# Patient Record
Sex: MALE | Race: WHITE | NOT HISPANIC OR LATINO | Employment: UNEMPLOYED | URBAN - NONMETROPOLITAN AREA
[De-identification: names, ages, dates, MRNs, and addresses within clinical notes are randomized per-mention and may not be internally consistent; named-entity substitution may affect disease eponyms.]

---

## 2022-08-02 ENCOUNTER — HOSPITAL ENCOUNTER (EMERGENCY)
Facility: HOSPITAL | Age: 42
Discharge: PSYCHIATRIC OR SUBSTANCE USE FACILITY | End: 2022-08-02
Attending: EMERGENCY MEDICINE | Admitting: EMERGENCY MEDICINE

## 2022-08-02 ENCOUNTER — HOSPITAL ENCOUNTER (INPATIENT)
Facility: HOSPITAL | Age: 42
LOS: 2 days | Discharge: HOME | End: 2022-08-04
Attending: PSYCHIATRY & NEUROLOGY | Admitting: PSYCHIATRY & NEUROLOGY

## 2022-08-02 VITALS
WEIGHT: 160 LBS | RESPIRATION RATE: 18 BRPM | SYSTOLIC BLOOD PRESSURE: 129 MMHG | HEIGHT: 74 IN | HEART RATE: 58 BPM | DIASTOLIC BLOOD PRESSURE: 85 MMHG | OXYGEN SATURATION: 100 % | BODY MASS INDEX: 20.53 KG/M2 | TEMPERATURE: 97.7 F

## 2022-08-02 DIAGNOSIS — F19.10 SUBSTANCE ABUSE: ICD-10-CM

## 2022-08-02 DIAGNOSIS — F10.10 ALCOHOL ABUSE: Primary | ICD-10-CM

## 2022-08-02 PROBLEM — F10.20 ALCOHOL DEPENDENCE: Status: ACTIVE | Noted: 2022-08-02

## 2022-08-02 LAB
ALBUMIN SERPL-MCNC: 4.14 G/DL (ref 3.5–5.2)
ALBUMIN/GLOB SERPL: 1.6 G/DL
ALP SERPL-CCNC: 62 U/L (ref 39–117)
ALT SERPL W P-5'-P-CCNC: 144 U/L (ref 1–41)
AMPHET+METHAMPHET UR QL: POSITIVE
AMPHETAMINES UR QL: POSITIVE
ANION GAP SERPL CALCULATED.3IONS-SCNC: 10.2 MMOL/L (ref 5–15)
AST SERPL-CCNC: 148 U/L (ref 1–40)
BACTERIA UR QL AUTO: NORMAL /HPF
BARBITURATES UR QL SCN: NEGATIVE
BASOPHILS # BLD AUTO: 0.06 10*3/MM3 (ref 0–0.2)
BASOPHILS NFR BLD AUTO: 0.8 % (ref 0–1.5)
BENZODIAZ UR QL SCN: NEGATIVE
BILIRUB SERPL-MCNC: 0.5 MG/DL (ref 0–1.2)
BILIRUB UR QL STRIP: NEGATIVE
BUN SERPL-MCNC: 10 MG/DL (ref 6–20)
BUN/CREAT SERPL: 15.2 (ref 7–25)
BUPRENORPHINE SERPL-MCNC: NEGATIVE NG/ML
CALCIUM SPEC-SCNC: 9.4 MG/DL (ref 8.6–10.5)
CANNABINOIDS SERPL QL: POSITIVE
CHLORIDE SERPL-SCNC: 102 MMOL/L (ref 98–107)
CLARITY UR: ABNORMAL
CO2 SERPL-SCNC: 25.8 MMOL/L (ref 22–29)
COCAINE UR QL: NEGATIVE
COLOR UR: ABNORMAL
CREAT SERPL-MCNC: 0.66 MG/DL (ref 0.76–1.27)
DEPRECATED RDW RBC AUTO: 44.9 FL (ref 37–54)
EGFRCR SERPLBLD CKD-EPI 2021: 118.6 ML/MIN/1.73
EOSINOPHIL # BLD AUTO: 0.07 10*3/MM3 (ref 0–0.4)
EOSINOPHIL NFR BLD AUTO: 1 % (ref 0.3–6.2)
ERYTHROCYTE [DISTWIDTH] IN BLOOD BY AUTOMATED COUNT: 12.5 % (ref 12.3–15.4)
ETHANOL BLD-MCNC: <10 MG/DL (ref 0–10)
ETHANOL UR QL: <0.01 %
FLUAV RNA RESP QL NAA+PROBE: NOT DETECTED
FLUBV RNA RESP QL NAA+PROBE: NOT DETECTED
GLOBULIN UR ELPH-MCNC: 2.7 GM/DL
GLUCOSE SERPL-MCNC: 106 MG/DL (ref 65–99)
GLUCOSE UR STRIP-MCNC: NEGATIVE MG/DL
HAV IGM SERPL QL IA: ABNORMAL
HBV CORE IGM SERPL QL IA: REACTIVE
HBV SURFACE AG SERPL QL IA: ABNORMAL
HCT VFR BLD AUTO: 39.1 % (ref 37.5–51)
HCV AB SER DONR QL: REACTIVE
HGB BLD-MCNC: 13.6 G/DL (ref 13–17.7)
HGB UR QL STRIP.AUTO: NEGATIVE
HIV1+2 AB SER QL: NORMAL
HYALINE CASTS UR QL AUTO: NORMAL /LPF
IMM GRANULOCYTES # BLD AUTO: 0.02 10*3/MM3 (ref 0–0.05)
IMM GRANULOCYTES NFR BLD AUTO: 0.3 % (ref 0–0.5)
KETONES UR QL STRIP: ABNORMAL
LEUKOCYTE ESTERASE UR QL STRIP.AUTO: ABNORMAL
LYMPHOCYTES # BLD AUTO: 2.6 10*3/MM3 (ref 0.7–3.1)
LYMPHOCYTES NFR BLD AUTO: 36.3 % (ref 19.6–45.3)
MAGNESIUM SERPL-MCNC: 1.7 MG/DL (ref 1.6–2.6)
MCH RBC QN AUTO: 33.7 PG (ref 26.6–33)
MCHC RBC AUTO-ENTMCNC: 34.8 G/DL (ref 31.5–35.7)
MCV RBC AUTO: 96.8 FL (ref 79–97)
METHADONE UR QL SCN: NEGATIVE
MONOCYTES # BLD AUTO: 0.68 10*3/MM3 (ref 0.1–0.9)
MONOCYTES NFR BLD AUTO: 9.5 % (ref 5–12)
NEUTROPHILS NFR BLD AUTO: 3.74 10*3/MM3 (ref 1.7–7)
NEUTROPHILS NFR BLD AUTO: 52.1 % (ref 42.7–76)
NITRITE UR QL STRIP: NEGATIVE
NRBC BLD AUTO-RTO: 0 /100 WBC (ref 0–0.2)
OPIATES UR QL: NEGATIVE
OXYCODONE UR QL SCN: NEGATIVE
PCP UR QL SCN: NEGATIVE
PH UR STRIP.AUTO: 7 [PH] (ref 5–8)
PLATELET # BLD AUTO: 200 10*3/MM3 (ref 140–450)
PMV BLD AUTO: 9.2 FL (ref 6–12)
POTASSIUM SERPL-SCNC: 4.1 MMOL/L (ref 3.5–5.2)
PROPOXYPH UR QL: NEGATIVE
PROT SERPL-MCNC: 6.8 G/DL (ref 6–8.5)
PROT UR QL STRIP: NEGATIVE
QT INTERVAL: 462 MS
QTC INTERVAL: 408 MS
RBC # BLD AUTO: 4.04 10*6/MM3 (ref 4.14–5.8)
RBC # UR STRIP: NORMAL /HPF
REF LAB TEST METHOD: NORMAL
SARS-COV-2 RNA RESP QL NAA+PROBE: NOT DETECTED
SODIUM SERPL-SCNC: 138 MMOL/L (ref 136–145)
SP GR UR STRIP: 1.02 (ref 1–1.03)
SQUAMOUS #/AREA URNS HPF: NORMAL /HPF
TRICYCLICS UR QL SCN: NEGATIVE
UROBILINOGEN UR QL STRIP: ABNORMAL
WBC # UR STRIP: NORMAL /HPF
WBC NRBC COR # BLD: 7.17 10*3/MM3 (ref 3.4–10.8)

## 2022-08-02 PROCEDURE — 82077 ASSAY SPEC XCP UR&BREATH IA: CPT | Performed by: PHYSICIAN ASSISTANT

## 2022-08-02 PROCEDURE — 93005 ELECTROCARDIOGRAM TRACING: CPT | Performed by: PSYCHIATRY & NEUROLOGY

## 2022-08-02 PROCEDURE — 85025 COMPLETE CBC W/AUTO DIFF WBC: CPT | Performed by: PHYSICIAN ASSISTANT

## 2022-08-02 PROCEDURE — 83735 ASSAY OF MAGNESIUM: CPT | Performed by: PHYSICIAN ASSISTANT

## 2022-08-02 PROCEDURE — 99284 EMERGENCY DEPT VISIT MOD MDM: CPT

## 2022-08-02 PROCEDURE — 87636 SARSCOV2 & INF A&B AMP PRB: CPT | Performed by: PHYSICIAN ASSISTANT

## 2022-08-02 PROCEDURE — 36415 COLL VENOUS BLD VENIPUNCTURE: CPT

## 2022-08-02 PROCEDURE — 80074 ACUTE HEPATITIS PANEL: CPT | Performed by: PSYCHIATRY & NEUROLOGY

## 2022-08-02 PROCEDURE — 80306 DRUG TEST PRSMV INSTRMNT: CPT | Performed by: PHYSICIAN ASSISTANT

## 2022-08-02 PROCEDURE — C9803 HOPD COVID-19 SPEC COLLECT: HCPCS

## 2022-08-02 PROCEDURE — 80053 COMPREHEN METABOLIC PANEL: CPT | Performed by: PHYSICIAN ASSISTANT

## 2022-08-02 PROCEDURE — G0432 EIA HIV-1/HIV-2 SCREEN: HCPCS | Performed by: PSYCHIATRY & NEUROLOGY

## 2022-08-02 PROCEDURE — 81001 URINALYSIS AUTO W/SCOPE: CPT | Performed by: PHYSICIAN ASSISTANT

## 2022-08-02 RX ORDER — HYDROXYZINE 50 MG/1
50 TABLET, FILM COATED ORAL EVERY 6 HOURS PRN
Status: DISCONTINUED | OUTPATIENT
Start: 2022-08-02 | End: 2022-08-04 | Stop reason: HOSPADM

## 2022-08-02 RX ORDER — TRAZODONE HYDROCHLORIDE 50 MG/1
50 TABLET ORAL NIGHTLY PRN
Status: DISCONTINUED | OUTPATIENT
Start: 2022-08-02 | End: 2022-08-04 | Stop reason: HOSPADM

## 2022-08-02 RX ORDER — ONDANSETRON 4 MG/1
4 TABLET, FILM COATED ORAL EVERY 6 HOURS PRN
Status: DISCONTINUED | OUTPATIENT
Start: 2022-08-02 | End: 2022-08-04 | Stop reason: HOSPADM

## 2022-08-02 RX ORDER — LORAZEPAM 0.5 MG/1
0.5 TABLET ORAL
Status: DISCONTINUED | OUTPATIENT
Start: 2022-08-06 | End: 2022-08-04

## 2022-08-02 RX ORDER — LORAZEPAM 2 MG/1
2 TABLET ORAL EVERY 4 HOURS PRN
Status: DISCONTINUED | OUTPATIENT
Start: 2022-08-03 | End: 2022-08-04

## 2022-08-02 RX ORDER — LORAZEPAM 1 MG/1
1 TABLET ORAL
Status: DISCONTINUED | OUTPATIENT
Start: 2022-08-05 | End: 2022-08-04

## 2022-08-02 RX ORDER — BENZTROPINE MESYLATE 1 MG/ML
1 INJECTION INTRAMUSCULAR; INTRAVENOUS ONCE AS NEEDED
Status: DISCONTINUED | OUTPATIENT
Start: 2022-08-02 | End: 2022-08-04 | Stop reason: HOSPADM

## 2022-08-02 RX ORDER — BENZONATATE 100 MG/1
100 CAPSULE ORAL 3 TIMES DAILY PRN
Status: DISCONTINUED | OUTPATIENT
Start: 2022-08-02 | End: 2022-08-04 | Stop reason: HOSPADM

## 2022-08-02 RX ORDER — LORAZEPAM 2 MG/1
2 TABLET ORAL
Status: DISCONTINUED | OUTPATIENT
Start: 2022-08-03 | End: 2022-08-04

## 2022-08-02 RX ORDER — LORAZEPAM 0.5 MG/1
0.5 TABLET ORAL EVERY 4 HOURS PRN
Status: DISCONTINUED | OUTPATIENT
Start: 2022-08-06 | End: 2022-08-04

## 2022-08-02 RX ORDER — IBUPROFEN 400 MG/1
400 TABLET ORAL EVERY 6 HOURS PRN
Status: DISCONTINUED | OUTPATIENT
Start: 2022-08-02 | End: 2022-08-04 | Stop reason: HOSPADM

## 2022-08-02 RX ORDER — MULTIPLE VITAMINS W/ MINERALS TAB 9MG-400MCG
1 TAB ORAL DAILY
Status: DISCONTINUED | OUTPATIENT
Start: 2022-08-02 | End: 2022-08-04 | Stop reason: HOSPADM

## 2022-08-02 RX ORDER — LORAZEPAM 2 MG/1
2 TABLET ORAL
Status: ACTIVE | OUTPATIENT
Start: 2022-08-02 | End: 2022-08-03

## 2022-08-02 RX ORDER — ECHINACEA PURPUREA EXTRACT 125 MG
2 TABLET ORAL AS NEEDED
Status: DISCONTINUED | OUTPATIENT
Start: 2022-08-02 | End: 2022-08-04 | Stop reason: HOSPADM

## 2022-08-02 RX ORDER — FAMOTIDINE 20 MG/1
20 TABLET, FILM COATED ORAL 2 TIMES DAILY PRN
Status: DISCONTINUED | OUTPATIENT
Start: 2022-08-02 | End: 2022-08-04 | Stop reason: HOSPADM

## 2022-08-02 RX ORDER — LORAZEPAM 1 MG/1
1 TABLET ORAL EVERY 4 HOURS PRN
Status: DISCONTINUED | OUTPATIENT
Start: 2022-08-05 | End: 2022-08-04

## 2022-08-02 RX ORDER — MULTIVITAMIN WITH IRON
2 TABLET ORAL DAILY
Status: DISCONTINUED | OUTPATIENT
Start: 2022-08-02 | End: 2022-08-04 | Stop reason: HOSPADM

## 2022-08-02 RX ORDER — LOPERAMIDE HYDROCHLORIDE 2 MG/1
2 CAPSULE ORAL
Status: DISCONTINUED | OUTPATIENT
Start: 2022-08-02 | End: 2022-08-04 | Stop reason: HOSPADM

## 2022-08-02 RX ORDER — ALUMINA, MAGNESIA, AND SIMETHICONE 2400; 2400; 240 MG/30ML; MG/30ML; MG/30ML
15 SUSPENSION ORAL EVERY 6 HOURS PRN
Status: DISCONTINUED | OUTPATIENT
Start: 2022-08-02 | End: 2022-08-04 | Stop reason: HOSPADM

## 2022-08-02 RX ORDER — BENZTROPINE MESYLATE 1 MG/1
2 TABLET ORAL ONCE AS NEEDED
Status: DISCONTINUED | OUTPATIENT
Start: 2022-08-02 | End: 2022-08-04 | Stop reason: HOSPADM

## 2022-08-02 NOTE — NURSING NOTE
Patient presents to intake and states that he is from [place]. He states that a oriana who helps out the homeless there told him about this place here that can help him and called them. They came and picked him up. He states that he has been living on the streets since getting out of [facility] last year. Hx of 12 felonies reported by patient with several [facility] term. He states that he has been at this new place since last night and he went to see the nurse this am and was telling her he was feeling bad so she said he needed to come here to detox. He reports that earlier he was shaking all over and hurting real bad. Hx of chronic pain. He states that he drinks and usually does so all thru ought the day. Some days up to 4 pints of liq if he can get it on the street. He also reports smoking pot once or twice a week and shooting iv  ice about a half a gm and sometimes more on average every few days when he can get it. Last use of any ice or pot was two days ago. Last use of etoh was yesterday around 2 pm. Pt denies any active SI HI or AVH. Appetite and sleep poor for months reports pt. CIWA 8 at this time. He states that he feels bad but earlier he was feeling rough. At this time he reports a headache and feeling some nausea and achy.

## 2022-08-02 NOTE — PLAN OF CARE
Problem: Adult Behavioral Health Plan of Care  Goal: Plan of Care Review  Recent Flowsheet Documentation  Taken 8/2/2022 1649 by Rosemary Powell, RN  Plan of Care Reviewed With: patient  Patient Agreement with Plan of Care: agrees   Goal Outcome Evaluation:  Plan of Care Reviewed With: patient  Patient Agreement with Plan of Care: agrees      New admission.  Care plan initiated.

## 2022-08-02 NOTE — NURSING NOTE
Spoke to  via phone. Intake information provided. Instructed to admit the patient to CD . Admit orders received. RBVOx2.. Patient and ed provider made aware of plan of care. Safety precautions maintained.

## 2022-08-02 NOTE — ED PROVIDER NOTES
Subjective   42-year-old white male presents secondary to need for alcohol detox and detox from methamphetamine.  He states his last alcohol use was 2 days ago and his last methamphetamine use was 3 days ago.  He states he uses that IV.  He denies any suicidal homicidal ideation.  Denies any exposure to COVID or COVID symptoms.  He voices no other complaints this time.  Symptoms are mild to moderate.          Review of Systems   Constitutional: Negative.  Negative for fever.   HENT: Negative.    Respiratory: Negative.    Cardiovascular: Negative.  Negative for chest pain.   Gastrointestinal: Negative.  Negative for abdominal pain.   Endocrine: Negative.    Genitourinary: Negative.  Negative for dysuria.   Skin: Negative.    Neurological: Negative.    Psychiatric/Behavioral: Negative.  Negative for suicidal ideas.   All other systems reviewed and are negative.      Past Medical History:   Diagnosis Date   • Alcohol abuse    • Anger reaction    • Anxiety    • Asthma    • Depression    • Liver disease    • PTSD (post-traumatic stress disorder)        No Known Allergies    No past surgical history on file.    No family history on file.    Social History     Socioeconomic History   • Marital status: Single   Tobacco Use   • Smoking status: Current Every Day Smoker     Packs/day: 1.00     Years: 0.50     Pack years: 0.50     Types: Cigarettes   • Smokeless tobacco: Never Used   Vaping Use   • Vaping Use: Never used   Substance and Sexual Activity   • Alcohol use: Yes     Comment: 3 to 4 pints thru out the day. I drink all day.   • Drug use: Yes     Types: Amphetamines   • Sexual activity: Yes     Partners: Female     Birth control/protection: None           Objective   Physical Exam  Vitals and nursing note reviewed.   Constitutional:       General: He is not in acute distress.     Appearance: He is well-developed. He is not diaphoretic.   HENT:      Head: Normocephalic and atraumatic.      Right Ear: External ear normal.       Left Ear: External ear normal.      Nose: Nose normal.   Eyes:      Conjunctiva/sclera: Conjunctivae normal.      Pupils: Pupils are equal, round, and reactive to light.   Neck:      Vascular: No JVD.      Trachea: No tracheal deviation.   Cardiovascular:      Rate and Rhythm: Normal rate and regular rhythm.      Heart sounds: Normal heart sounds. No murmur heard.  Pulmonary:      Effort: Pulmonary effort is normal. No respiratory distress.      Breath sounds: Normal breath sounds. No wheezing.   Abdominal:      General: Bowel sounds are normal.      Palpations: Abdomen is soft.      Tenderness: There is no abdominal tenderness.   Musculoskeletal:         General: No deformity. Normal range of motion.      Cervical back: Normal range of motion and neck supple.   Skin:     General: Skin is warm and dry.      Coloration: Skin is not pale.      Findings: No erythema or rash.   Neurological:      Mental Status: He is alert and oriented to person, place, and time.      Cranial Nerves: No cranial nerve deficit.   Psychiatric:         Behavior: Behavior normal.         Thought Content: Thought content normal. Thought content does not include homicidal or suicidal ideation.         Procedures           ED Course                                           MDM  Number of Diagnoses or Management Options  Alcohol abuse: established and worsening  Substance abuse (HCC): established and worsening     Amount and/or Complexity of Data Reviewed  Clinical lab tests: reviewed and ordered        Final diagnoses:   Alcohol abuse   Substance abuse (HCC)       ED Disposition  ED Disposition     ED Disposition   DC/Transfer to Behavioral Health Condition   Stable    Comment   --             No follow-up provider specified.       Medication List      No changes were made to your prescriptions during this visit.          Bernard Damon PA  08/02/22 9688

## 2022-08-03 PROBLEM — F15.20 METHAMPHETAMINE USE DISORDER, SEVERE: Status: ACTIVE | Noted: 2022-08-03

## 2022-08-03 PROCEDURE — HZ2ZZZZ DETOXIFICATION SERVICES FOR SUBSTANCE ABUSE TREATMENT: ICD-10-PCS | Performed by: PSYCHIATRY & NEUROLOGY

## 2022-08-03 PROCEDURE — 99223 1ST HOSP IP/OBS HIGH 75: CPT | Performed by: PSYCHIATRY & NEUROLOGY

## 2022-08-03 RX ORDER — NALOXONE HYDROCHLORIDE 4 MG/.1ML
1 SPRAY NASAL AS NEEDED
Qty: 2 EACH | Refills: 0 | Status: SHIPPED | OUTPATIENT
Start: 2022-08-03

## 2022-08-03 RX ADMIN — IBUPROFEN 400 MG: 400 TABLET, FILM COATED ORAL at 17:51

## 2022-08-03 RX ADMIN — Medication 2 TABLET: at 12:01

## 2022-08-03 RX ADMIN — Medication 1 TABLET: at 12:01

## 2022-08-03 RX ADMIN — Medication 100 MG: at 12:01

## 2022-08-03 NOTE — H&P
INITIAL PSYCHIATRIC HISTORY & PHYSICAL    Patient Identification:  Name:   Joey Mcdonald  Age:   42 y.o.  Sex:   male  :   1980  MRN:   [id number]  Visit Number:   [id number]  Primary Care Physician:   Provider, No Known    SUBJECTIVE    CC/Focus of Exam: detox from alcohol and drugs    HPI: Joey Mcdonald is a 42 y.o. male who was admitted on 2022 with complaints of alcohol use and withdrawals. The patient reports a long history of substance use. First use was 3 years old. Over time the use increased and the patient  continued to use despite negative consequences. The patient endorses symptoms of tolerance and withdrawals. Has tried to cut down and stop but has not been successful. Spends too much time and resources in pursuit of substance use. Longest period of sobriety is reported to be 1 month.  Currently using marijuana, meth, 4 pints liquor  Last use 2022  Withdrawal symptoms nausea, body aches, migraines  Patient states that he uses tobacco. Patient denies any history of seizures with withdrawal. Patient states that he wanted to get high so he relapsed. Patient states life as a stressor in his life. Patient denies any history of physical, mental or sexual abuse. Patient rates his appetite as poor. Patient rates his sleep as poor. Patient states that he has nightmares. Patient rates his anxiety on a scale of 1/10 with 10 being the most severe a 10. Patient rates his depression on a scale of 1/10 with 10 being the most severe a 10. Patient rates his cravings on a scale of 1/10 with 10 being the most severe a 8. Patient's CIWA was 8. Patient denies any suicidal ideation. Patient denies any homicidal ideation. Patient denies any hallucinations.  Patient was admitted to [facility] psychiatry for further safety and stabilization.    Available medical/psychiatric records reviewed and incorporated into the current document.     PAST PSYCHIATRIC HX: Patient has had no prior admission.  Patient denies any outpatient care.     SUBSTANCE USE HX: UDS was positive for Methamphetamine, Amphetamine, THC. See HPI for current use.     SOCIAL HX: Patient states that he was born and raised in [place], [place]. Patient states that he is currently homeless. Patient states that he is single and has no children. Patient states that he is currently unemployed. Patient states that he has a 7th grade education. Patient denies any legal issues.     Past Medical History:   Diagnosis Date   • Alcohol abuse    • Anger reaction    • Anxiety    • Asthma    • Depression    • Hepatitis B    • Hepatitis C    • PTSD (post-traumatic stress disorder)        Past Surgical History:   Procedure Laterality Date   • BONY PELVIS SURGERY     • HIP SURGERY     • KNEE ARTHROSCOPY     • WRIST SURGERY         History reviewed. No pertinent family history.      No medications prior to admission.           ALLERGIES:  Patient has no known allergies.    Temp:  [97.2 °F (36.2 °C)-97.8 °F (36.6 °C)] 97.8 °F (36.6 °C)  Heart Rate:  [49-58] 58  Resp:  [16-18] 18  BP: (107-135)/(68-98) 135/85    REVIEW OF SYSTEMS:  Review of Systems   Constitutional: Negative.    HENT: Negative.    Eyes: Negative.    Respiratory: Negative.    Cardiovascular: Negative.    Gastrointestinal: Negative.    Endocrine: Negative.    Genitourinary: Negative.    Musculoskeletal: Negative.    Skin: Negative.    Allergic/Immunologic: Negative.    Neurological: Negative.    Hematological: Negative.         OBJECTIVE    PHYSICAL EXAM:  Physical Exam  Constitutional:  Appears well-developed and well-nourished.   HENT:   Head: Normocephalic and atraumatic.   Right Ear: External ear normal.   Left Ear: External ear normal.   Mouth/Throat: Oropharynx is clear and moist.   Eyes: Pupils are equal, round, and reactive to light. Conjunctivae and EOM are normal.   Neck: Normal range of motion. Neck supple.   Cardiovascular: Normal rate, regular rhythm and normal heart sounds.     Respiratory: Effort normal and breath sounds normal. No respiratory distress. No wheezes.   GI: Soft. Bowel sounds are normal.No distension. There is no tenderness.   Musculoskeletal: Normal range of motion. No edema or deformity.   Neurological:No cranial nerve deficit. Coordination normal.   Skin: Skin is warm and dry. No rash noted. No erythema.       MENTAL STATUS EXAM:               Hygiene:   poor  Cooperation:  Cooperative  Eye Contact:  Poor  Psychomotor Behavior:  Appropriate  Affect:  Appropriate  Hopelessness: 5  Speech:  Normal  Linear  Thought Content:  Normal  Suicidal:  None  Homicidal:  None  Hallucinations:  None  Delusion:  None  Memory:  Intact  Orientation:  Person, Place and Situation  Reliability:  fair  Insight:  Poor  Judgement:  Poor  Impulse Control:  Poor      Imaging Results (Last 24 Hours)     ** No results found for the last 24 hours. **           ECG/EMG Results (most recent)     Procedure Component Value Units Date/Time    ECG 12 Lead [[id number]] Collected: 08/02/22 1736     Updated: 08/02/22 2242     QT Interval 462 ms      QTC Interval 408 ms     Narrative:      Test Reason : Baseline Cardiac Status  Blood Pressure :   */*   mmHG  Vent. Rate :  47 BPM     Atrial Rate :  47 BPM     P-R Int : 136 ms          QRS Dur :  98 ms      QT Int : 462 ms       P-R-T Axes :  36  82  75 degrees     QTc Int : 408 ms    Sinus bradycardia  Otherwise normal ECG  Confirmed by Meghan Lindo ([id number]) on 8/2/2022 10:41:58 PM    Referred By: JULIO           Confirmed By: Meghan Lindo           Lab Results   Component Value Date    GLUCOSE 106 (H) 08/02/2022    BUN 10 08/02/2022    CREATININE 0.66 (L) 08/02/2022    BCR 15.2 08/02/2022    CO2 25.8 08/02/2022    CALCIUM 9.4 08/02/2022    ALBUMIN 4.14 08/02/2022     (H) 08/02/2022     (H) 08/02/2022       Lab Results   Component Value Date    WBC 7.17 08/02/2022    HGB 13.6 08/02/2022    HCT 39.1 08/02/2022    MCV 96.8 08/02/2022    PLT  200 08/02/2022       Pain Management Panel     Pain Management Panel Latest Ref Rng & Units 8/2/2022    AMPHETAMINES SCREEN, URINE Negative Positive(A)    BARBITURATES SCREEN Negative Negative    BENZODIAZEPINE SCREEN, URINE Negative Negative    BUPRENORPHINEUR Negative Negative    COCAINE SCREEN, URINE Negative Negative    METHADONE SCREEN, URINE Negative Negative    METHAMPHETAMINEUR Negative Positive(A)          Brief Urine Lab Results  (Last result in the past 365 days)      Color   Clarity   Blood   Leuk Est   Nitrite   Protein   CREAT   Urine HCG        08/02/22 1403 Dark Yellow   Cloudy   Negative   Trace   Negative   Negative               DATA  Labs reviewed. Glucose 106. . . Hep B and Hep C reactive. UA shows dark yellow color, cloudy appearance, trace ketones, trace leukocyte esterase. Blood alcohol level less than 10 mg/dL. UDS positive for amphetamine, methamphetamine and thc.   EKG reviewed. QTc 408.  BRENNAN reviewed.   Record reviewed. No previous treatments noted in this hospital.     ASSESSMENT & PLAN:        Methamphetamine use disorder, severe (HCC)      Alcohol use disorder, severe, dependence (HCC)        The patient is not exhibiting any withdrawals from alcohol but appears dysphoric due to coming off methamphetamine.  He has bradycardia and blood pressure is normal and he is not eligible to get any prn medications and he is not happy about it. He struggles to give a description of his withdrawal symptoms. He states that he is not happy to be here if he is not going to get any medications. He is informed that we will monitor and treat withdrawal symptoms as they emerge and if he is not happy he is free to leave. He states he will call the rehab facility staff to come get him.       Written by Samaria Flores acting as scribe for Dr.Mazhar Craft signature on this note affirms that the note adequately documents the care provided.   This note was generated using a scribe,   Samaria  MELVINA Flores  08/03/22  6:45 AM EDT    I, Isabela Craft MD, personally performed the services described in this documentation as scribed by the above named individual in my presence, and it is both accurate and complete.

## 2022-08-03 NOTE — PLAN OF CARE
Problem: Adult Behavioral Health Plan of Care  Goal: Plan of Care Review  Outcome: Ongoing, Progressing  Flowsheets (Taken 8/3/2022 1606)  Consent Given to Review Plan with: no consent today  Progress: no change  Plan of Care Reviewed With: patient  Patient Agreement with Plan of Care: agrees  Outcome Evaluation: reviewed plan of care and completed adult social history     Problem: Adult Behavioral Health Plan of Care  Goal: Patient-Specific Goal (Individualization)  Outcome: Ongoing, Progressing  Flowsheets  Taken 8/3/2022 1606 by Shira Fischer LCSW  Patient-Specific Goals (Include Timeframe): Joey to completed medical detox prior to discharge, identify 1-2 healthy coping skills to assist with relapse prevention during his 3 to 7 day hospital stay, engage in safe disposition planning.  Individualized Care Needs: Medication management, individual and group therapy  Taken 8/3/2022 1449 by Shira Fischer LCSW  Patient Personal Strengths: resourceful  Patient Vulnerabilities: substance abuse/addiction  Taken 8/2/2022 1649 by Rosemary Powell RN  Anxieties, Fears or Concerns: None verbalized     Problem: Adult Behavioral Health Plan of Care  Goal: Optimized Coping Skills in Response to Life Stressors  Outcome: Ongoing, Progressing  Flowsheets (Taken 8/3/2022 1606)  Optimized Coping Skills in Response to Life Stressors: making progress toward outcome     Problem: Adult Behavioral Health Plan of Care  Goal: Optimized Coping Skills in Response to Life Stressors  Intervention: Promote Effective Coping Strategies  Flowsheets (Taken 8/3/2022 1606)  Supportive Measures:   active listening utilized   positive reinforcement provided   self-responsibility promoted   counseling provided   problem-solving facilitated   verbalization of feelings encouraged   decision-making supported   goal-setting facilitated   self-care encouraged   self-reflection promoted     Problem: Adult Behavioral Health Plan of  Care  Goal: Develops/Participates in Therapeutic [place] to Support Successful Transition  Outcome: Ongoing, Progressing  Flowsheets (Taken 8/3/2022 1606)  Develops/Participates in Therapeutic [place] to Support Successful Transition: making progress toward outcome     Problem: Adult Behavioral Health Plan of Care  Goal: Develops/Participates in Therapeutic [place] to Support Successful Transition  Intervention: Foster Therapeutic [place]  Flowsheets (Taken 8/3/2022 1606)  Trust Relationship/Rapport:   care explained   reassurance provided   choices provided   thoughts/feelings acknowledged   emotional support provided   empathic listening provided   questions answered   questions encouraged     Problem: Adult Behavioral Health Plan of Care  Goal: Develops/Participates in Therapeutic [place] to Support Successful Transition  Intervention: Mutually Develop Transition Plan  Flowsheets  Taken 8/3/2022 1606  Transition Support:   community resources reviewed   crisis management plan promoted   follow-up care discussed  Taken 8/3/2022 6435  Discharge Coordination/Progress: Patient has Medicaid Pending. signed consent for [facility] treatment.  Transportation Anticipated: agency  Current Discharge Risk: substance use/abuse  Concerns to be Addressed:   coping/stress   substance/tobacco abuse/use  Readmission Within the Last 30 Days: no previous admission in last 30 days  Patient/Family Anticipated Services at Transition: rehabilitation services  Patient's Choice of Community Agency(s): patient signed consent for [facility]  Patient/Family Anticipates Transition to: inpatient rehabilitation facility  Offered/Gave Vendor List: no   Goal Outcome Evaluation:  Plan of Care Reviewed With: patient  Patient Agreement with Plan of Care: agrees  Consent Given to Review Plan with: no consent today  Progress: no change  Outcome Evaluation: reviewed plan of care and completed adult social history    DATA:         Therapist met  individually with patient this date to introduce role and to discuss hospitalization expectations. Patient agreeable.      Clinical Maneuvering/Intervention:     Therapist assisted patient in processing above session content; acknowledged and normalized patient’s thoughts, feelings, and concerns.  Discussed the therapist/patient relationship and explain the parameters and limitations of relative confidentiality.  Also discussed the importance of active participation, and honesty to the treatment process.  Encouraged the patient to discuss/vent their feelings, frustrations, and fears concerning their ongoing medical issues and validated their feelings.     Discussed the importance of finding enjoyable activities and coping skills that the patient can engage in a regular basis. Discussed healthy coping skills such as distraction, self love, grounding, thought challenges/reframing, etc.  Provided patient with list of healthy coping skills this date. Discussed the importance of medication compliance.  Praised the patient for seeking help and spent the majority of the session building rapport.       Allowed patient to freely discuss issues without interruption or judgment. Provided safe, confidential environment to facilitate the development of positive therapeutic relationship and encourage open, honest communication.      Therapist addressed discharge safety planning this date. Assisted patient in identifying risk factors which would indicate the need for higher level of care after discharge;  including thoughts to harm self or others and/or self-harming behavior. Encouraged patient to call 911, or present to the nearest emergency room should any of these events occur. Discussed crisis intervention services and means to access.  Encouraged securing any objects of harm.       Therapist completed integrated summary, treatment plan, and initiated social history this date.  Therapist is strongly encouraging family  involvement in treatment.       ASSESSMENT:      The patient is a 42 year old  male, homeless and unemployed residing in [place].  Patient presented from [facility].  He reported that he started feeling rough and staff from [facility] transported him here.  He endorses withdrawal symptoms of nausea, body aches, and migraines. He reports that he has been smoking marijuana, drinking 2-4 pints of liquor daily, and injecting 0.5 gram of meth 2-3 times per week.  He presented irritable while talking with this therapist and reported that he has been unable to get medication due to his low pulse rate, he reports to this therapist he does not see the need to be in the hospital today.  He reports plans to return to [facility] upon discharge-consent obtained.       PLAN:       Patient to remain hospitalized this date.     Treatment team will focus efforts on stabilizing patient's acute symptoms while providing education on healthy coping and crisis management to reduce hospitalizations.   Patient requires daily psychiatrist evaluation and 24/7 nursing supervision to promote patient  safety.     Therapist will offer 1-4 individual sessions, 1 therapy group daily, family education, and appropriate referral.    Patient signed consent to return to [facility] upon stabilization.

## 2022-08-03 NOTE — PLAN OF CARE
"  Problem: Adult Behavioral Health Plan of Care  Goal: Plan of Care Review  Outcome: Ongoing, Progressing  Flowsheets (Taken 8/3/2022 1621)  Progress: no change  Plan of Care Reviewed With: patient  Patient Agreement with Plan of Care: agrees  Outcome Evaluation: Pt irritable.Pt upset that he has not had any detox meds.Pt's HR has been in the 40's to 50\"s so ativan could not be given.   Goal Outcome Evaluation:  Plan of Care Reviewed With: patient  Patient Agreement with Plan of Care: agrees     Progress: no change  Outcome Evaluation: Pt irritable.Pt upset that he has not had any detox meds.Pt's HR has been in the 40's to 50\"s so ativan could not be given.  "

## 2022-08-03 NOTE — PLAN OF CARE
Goal Outcome Evaluation:  Plan of Care Reviewed With: patient  Patient Agreement with Plan of Care: agrees     Progress: improving     Pt in bed entire shift. Pt refused to get out of bed in order to receive medications or interact with peers.  No prn medications required for this shift.

## 2022-08-03 NOTE — PROGRESS NOTES
The patient received naloxone education as part of group.  The patient received verbal and written education on the following:   - Risk factors of opioid overdose  - Strategies to prevent opioid overdose  - Signs of opioid overdose  - Steps in responding to an overdose   - Information about naloxone  - Procedures for administering naloxone  - Proper storage and expiration of the naloxone product dispensed      Pursuant to the [place] Board [employer] administrative regulation 201 JOSH 2:360, we will dispense:      Narcan® (naloxone HCl) 4mg/0.1mL nasal spray   Dispense #1 box (2 doses)    Sig: Call 911   Do not prime.  Spray into nostril upon signs of opioid overdose.     May repeat in 2-3 minutes in the opposite nostril if no or minimal breathing and  responsiveness, then as needed (if doses are available) every 2-3 minutes.      The signed protocol is kept in the [facility] [address]     The patient was given the opportunity to ask questions.  All questions were addressed.  The patient expressed understanding of the education provided.      Marli Ayoub, Pharmacy Intern  08/03/22  13:49 EDT

## 2022-08-04 VITALS
TEMPERATURE: 97.5 F | RESPIRATION RATE: 16 BRPM | SYSTOLIC BLOOD PRESSURE: 144 MMHG | HEIGHT: 74 IN | HEART RATE: 50 BPM | DIASTOLIC BLOOD PRESSURE: 82 MMHG | WEIGHT: 144 LBS | BODY MASS INDEX: 18.48 KG/M2 | OXYGEN SATURATION: 98 %

## 2022-08-04 PROCEDURE — 99238 HOSP IP/OBS DSCHRG MGMT 30/<: CPT | Performed by: PSYCHIATRY & NEUROLOGY

## 2022-08-04 RX ADMIN — Medication 100 MG: at 08:36

## 2022-08-04 RX ADMIN — Medication 1 TABLET: at 08:35

## 2022-08-04 RX ADMIN — Medication 2 TABLET: at 08:35

## 2022-08-04 RX ADMIN — IBUPROFEN 400 MG: 400 TABLET, FILM COATED ORAL at 08:49

## 2022-08-04 NOTE — PLAN OF CARE
Goal Outcome Evaluation:  Plan of Care Reviewed With: patient  Patient Agreement with Plan of Care: agrees        Outcome Evaluation: Patient was cooperative this shift.  Reported no cravings.  Withdrawal symptoms of nausea and headache.  Seemed to sleep well during the night.

## 2022-08-04 NOTE — PLAN OF CARE
Goal Outcome Evaluation:  Plan of Care Reviewed With: patient  Patient Agreement with Plan of Care: agrees      Pt to be d/c today to go to rehab facility. Expected  time 1:00pm.

## 2022-08-04 NOTE — DISCHARGE SUMMARY
":  1980  MRN:  [id number]  Visit Number:  [id number]      Date of Admission:2022   Date of Discharge:  2022    Discharge Diagnosis:  Principal Problem:    Methamphetamine use disorder, severe (HCC)  Active Problems:    Alcohol use disorder, severe, dependence (HCC)        Admission Diagnosis:  Alcohol dependence (HCC) [F10.20]     HPI  Joey Mcdonald is a 42 y.o. male who was admitted on 2022 with complaints of alcohol use and withdrawals.   For details please see H&P dated 8/3/22.    Hospital Course  Patient is a 42 y.o. male presented with alcohol use and withdrawals per his report. He was admitted to the detox recovery unit and started on Ativan detox. The patient's pulse and blood pressure stayed low and he didn't exhibit any active symptoms of withdrawals from alcohol and it appeared his symptoms were more consistent with methamphetamine use where he had body aches and dysphoria. The patient was not happy that he was not getting benzodiazepines. He was reassured that he will be monitored closely and if needed he will get the medication. The patient stayed one more day and next morning reported feeling better with good sleep and appetite and felt ready to go back to rehab treatment and was discharged.       Mental Status Exam upon discharge:   Mood \"better\"   Affect-congruent, appropriate, stable  Thought Content-goal directed, no delusional material present  Thought process-linear, organized.  Suicidality: No SI  Homicidality: No HI  Perception: No AH/VH    Procedures Performed         Consults:   Consults     No orders found from 2022 to 8/3/2022.          Pertinent Test Results:   Admission on 2022   Component Date Value Ref Range Status   • QT Interval 2022 462  ms Final   • QTC Interval 2022 408  ms Final   • Hepatitis B Surface Ag 2022 Non-Reactive  Non-Reactive Final   • Hep A IgM 2022 Non-Reactive  Non-Reactive Final   • Hep B C IgM 2022 " Reactive (A) Non-Reactive Final   • Hepatitis C Ab 08/02/2022 Reactive (A) Non-Reactive Final   • HIV-1/ HIV-2 08/02/2022 Non-Reactive  Non-Reactive Final    A non-reactive test result does not preclude the possibility of exposure to HIV or infection with HIV. An antibody response to recent exposure may take several months to reach detectable levels.   Admission on 08/02/2022, Discharged on 08/02/2022   Component Date Value Ref Range Status   • COVID19 08/02/2022 Not Detected  Not Detected - Ref. Range Final   • Influenza A PCR 08/02/2022 Not Detected  Not Detected Final   • Influenza B PCR 08/02/2022 Not Detected  Not Detected Final   • Glucose 08/02/2022 106 (A) 65 - 99 mg/dL Final   • BUN 08/02/2022 10  6 - 20 mg/dL Final   • Creatinine 08/02/2022 0.66 (A) 0.76 - 1.27 mg/dL Final   • Sodium 08/02/2022 138  136 - 145 mmol/L Final   • Potassium 08/02/2022 4.1  3.5 - 5.2 mmol/L Final   • Chloride 08/02/2022 102  98 - 107 mmol/L Final   • CO2 08/02/2022 25.8  22.0 - 29.0 mmol/L Final   • Calcium 08/02/2022 9.4  8.6 - 10.5 mg/dL Final   • Total Protein 08/02/2022 6.8  6.0 - 8.5 g/dL Final   • Albumin 08/02/2022 4.14  3.50 - 5.20 g/dL Final   • ALT (SGPT) 08/02/2022 144 (A) 1 - 41 U/L Final   • AST (SGOT) 08/02/2022 148 (A) 1 - 40 U/L Final   • Alkaline Phosphatase 08/02/2022 62  39 - 117 U/L Final   • Total Bilirubin 08/02/2022 0.5  0.0 - 1.2 mg/dL Final   • Globulin 08/02/2022 2.7  gm/dL Final   • A/G Ratio 08/02/2022 1.6  g/dL Final   • BUN/Creatinine Ratio 08/02/2022 15.2  7.0 - 25.0 Final   • Anion Gap 08/02/2022 10.2  5.0 - 15.0 mmol/L Final   • eGFR 08/02/2022 118.6  >60.0 mL/min/1.73 Final    National Kidney Foundation and American Society of Nephrology (ASN) Task Force recommended calculation based on the Chronic Kidney Disease Epidemiology Collaboration (CKD-EPI) equation refit without adjustment for race.   • Color, UA 08/02/2022 Dark Yellow (A) Yellow, Straw Final   • Appearance, UA 08/02/2022 Cloudy (A)  Clear Final   • pH, UA 08/02/2022 7.0  5.0 - 8.0 Final   • Specific Gravity, UA 08/02/2022 1.025  1.005 - 1.030 Final   • Glucose, UA 08/02/2022 Negative  Negative Final   • Ketones, UA 08/02/2022 Trace (A) Negative Final   • Bilirubin, UA 08/02/2022 Negative  Negative Final   • Blood, UA 08/02/2022 Negative  Negative Final   • Protein, UA 08/02/2022 Negative  Negative Final   • Leuk Esterase, UA 08/02/2022 Trace (A) Negative Final   • Nitrite, UA 08/02/2022 Negative  Negative Final   • Urobilinogen, UA 08/02/2022 2.0 E.U./dL (A) 0.2 - 1.0 E.U./dL Final   • Ethanol 08/02/2022 <10  0 - 10 mg/dL Final   • Ethanol % 08/02/2022 <0.010  % Final   • THC, Screen, Urine 08/02/2022 Positive (A) Negative Final   • Phencyclidine (PCP), Urine 08/02/2022 Negative  Negative Final   • Cocaine Screen, Urine 08/02/2022 Negative  Negative Final   • Methamphetamine, Ur 08/02/2022 Positive (A) Negative Final   • Opiate Screen 08/02/2022 Negative  Negative Final   • Amphetamine Screen, Urine 08/02/2022 Positive (A) Negative Final   • Benzodiazepine Screen, Urine 08/02/2022 Negative  Negative Final   • Tricyclic Antidepressants Screen 08/02/2022 Negative  Negative Final   • Methadone Screen, Urine 08/02/2022 Negative  Negative Final   • Barbiturates Screen, Urine 08/02/2022 Negative  Negative Final   • Oxycodone Screen, Urine 08/02/2022 Negative  Negative Final   • Propoxyphene Screen 08/02/2022 Negative  Negative Final   • Buprenorphine, Screen, Urine 08/02/2022 Negative  Negative Final   • Magnesium 08/02/2022 1.7  1.6 - 2.6 mg/dL Final   • WBC 08/02/2022 7.17  3.40 - 10.80 10*3/mm3 Final   • RBC 08/02/2022 4.04 (A) 4.14 - 5.80 10*6/mm3 Final   • Hemoglobin 08/02/2022 13.6  13.0 - 17.7 g/dL Final   • Hematocrit 08/02/2022 39.1  37.5 - 51.0 % Final   • MCV 08/02/2022 96.8  79.0 - 97.0 fL Final   • MCH 08/02/2022 33.7 (A) 26.6 - 33.0 pg Final   • MCHC 08/02/2022 34.8  31.5 - 35.7 g/dL Final   • RDW 08/02/2022 12.5  12.3 - 15.4 % Final    • RDW-SD 08/02/2022 44.9  37.0 - 54.0 fl Final   • MPV 08/02/2022 9.2  6.0 - 12.0 fL Final   • Platelets 08/02/2022 200  140 - 450 10*3/mm3 Final   • Neutrophil % 08/02/2022 52.1  42.7 - 76.0 % Final   • Lymphocyte % 08/02/2022 36.3  19.6 - 45.3 % Final   • Monocyte % 08/02/2022 9.5  5.0 - 12.0 % Final   • Eosinophil % 08/02/2022 1.0  0.3 - 6.2 % Final   • Basophil % 08/02/2022 0.8  0.0 - 1.5 % Final   • Immature Grans % 08/02/2022 0.3  0.0 - 0.5 % Final   • Neutrophils, Absolute 08/02/2022 3.74  1.70 - 7.00 10*3/mm3 Final   • Lymphocytes, Absolute 08/02/2022 2.60  0.70 - 3.10 10*3/mm3 Final   • Monocytes, Absolute 08/02/2022 0.68  0.10 - 0.90 10*3/mm3 Final   • Eosinophils, Absolute 08/02/2022 0.07  0.00 - 0.40 10*3/mm3 Final   • Basophils, Absolute 08/02/2022 0.06  0.00 - 0.20 10*3/mm3 Final   • Immature Grans, Absolute 08/02/2022 0.02  0.00 - 0.05 10*3/mm3 Final   • nRBC 08/02/2022 0.0  0.0 - 0.2 /100 WBC Final   • RBC, UA 08/02/2022 0-2  None Seen, 0-2 /HPF Final   • WBC, UA 08/02/2022 0-2  None Seen, 0-2 /HPF Final   • Bacteria, UA 08/02/2022 None Seen  None Seen /HPF Final   • Squamous Epithelial Cells, UA 08/02/2022 0-2  None Seen, 0-2 /HPF Final   • Hyaline Casts, UA 08/02/2022 None Seen  None Seen /LPF Final   • Methodology 08/02/2022 Automated Microscopy   Final        Condition on Discharge:  improved    Vital Signs  Temp:  [97.2 °F (36.2 °C)-98.1 °F (36.7 °C)] 97.5 °F (36.4 °C)  Heart Rate:  [49-60] 50  Resp:  [16-18] 16  BP: (120-144)/(82-90) 144/82      Discharge Disposition:  Home or Self Care    Discharge Medications:     Discharge Medications      New Medications      Instructions Start Date   naloxone 4 MG/0.1ML nasal spray  Commonly known as: NARCAN   Call 911. Don't prime. Spray into nostril for signs of overdose. May repeat in 2-3 mins in opposite nostril if no response as needed.             Discharge Diet: Regular     Activity at Discharge: As tolerated     Follow-up Appointments     [facility]  [address], [place] [address]  [phone]       Time spent in discharge: < 30 min    Clinician:   Isabela Craft MD  08/04/22  10:24 EDT

## 2022-08-04 NOTE — PROGRESS NOTES
Discharge Planning Assessment  [facility]     Patient Name: Joey Mcdonald  MRN: [id number]  Today's Date: 8/4/2022    Admit Date: 8/2/2022    Patient is being discharged back to [facility] today. Patient denies SI, HI, and AVH. This therapist spoke with Greta at [facility] who states that they will have someone here to get him at 1:00pm.    ALKA Robbins

## 2023-09-06 ENCOUNTER — HOSPITAL ENCOUNTER (EMERGENCY)
Facility: HOSPITAL | Age: 43
Discharge: HOME | End: 2023-09-06
Attending: STUDENT IN AN ORGANIZED HEALTH CARE EDUCATION/TRAINING PROGRAM
Payer: MEDICAID

## 2023-09-06 VITALS
RESPIRATION RATE: 18 BRPM | HEART RATE: 69 BPM | DIASTOLIC BLOOD PRESSURE: 75 MMHG | OXYGEN SATURATION: 98 % | HEIGHT: 74 IN | SYSTOLIC BLOOD PRESSURE: 115 MMHG | BODY MASS INDEX: 18.74 KG/M2 | TEMPERATURE: 97.7 F | WEIGHT: 146 LBS

## 2023-09-06 DIAGNOSIS — F19.10 POLYSUBSTANCE ABUSE: Primary | ICD-10-CM

## 2023-09-06 LAB
ALBUMIN SERPL-MCNC: 4.1 G/DL (ref 3.5–5.2)
ALBUMIN/GLOB SERPL: 1.4 G/DL
ALP SERPL-CCNC: 69 U/L (ref 39–117)
ALT SERPL W P-5'-P-CCNC: 338 U/L (ref 1–41)
AMPHET+METHAMPHET UR QL: POSITIVE
AMPHETAMINES UR QL: POSITIVE
ANION GAP SERPL CALCULATED.3IONS-SCNC: 9.4 MMOL/L (ref 5–15)
AST SERPL-CCNC: 372 U/L (ref 1–40)
BARBITURATES UR QL SCN: NEGATIVE
BASOPHILS # BLD AUTO: 0.1 10*3/MM3 (ref 0–0.2)
BASOPHILS NFR BLD AUTO: 1.3 % (ref 0–1.5)
BENZODIAZ UR QL SCN: POSITIVE
BILIRUB SERPL-MCNC: 0.3 MG/DL (ref 0–1.2)
BILIRUB UR QL STRIP: NEGATIVE
BUN SERPL-MCNC: 8 MG/DL (ref 6–20)
BUN/CREAT SERPL: 11.6 (ref 7–25)
BUPRENORPHINE SERPL-MCNC: NEGATIVE NG/ML
CALCIUM SPEC-SCNC: 9.2 MG/DL (ref 8.6–10.5)
CANNABINOIDS SERPL QL: POSITIVE
CHLORIDE SERPL-SCNC: 102 MMOL/L (ref 98–107)
CLARITY UR: CLEAR
CO2 SERPL-SCNC: 25.6 MMOL/L (ref 22–29)
COCAINE UR QL: NEGATIVE
COLOR UR: ABNORMAL
CREAT SERPL-MCNC: 0.69 MG/DL (ref 0.76–1.27)
DEPRECATED RDW RBC AUTO: 45.4 FL (ref 37–54)
EGFRCR SERPLBLD CKD-EPI 2021: 116.3 ML/MIN/1.73
EOSINOPHIL # BLD AUTO: 0.32 10*3/MM3 (ref 0–0.4)
EOSINOPHIL NFR BLD AUTO: 4.1 % (ref 0.3–6.2)
ERYTHROCYTE [DISTWIDTH] IN BLOOD BY AUTOMATED COUNT: 13 % (ref 12.3–15.4)
ETHANOL BLD-MCNC: <10 MG/DL (ref 0–10)
ETHANOL UR QL: <0.01 %
FENTANYL UR-MCNC: NEGATIVE NG/ML
GLOBULIN UR ELPH-MCNC: 3 GM/DL
GLUCOSE SERPL-MCNC: 68 MG/DL (ref 65–99)
GLUCOSE UR STRIP-MCNC: NEGATIVE MG/DL
HCT VFR BLD AUTO: 42.2 % (ref 37.5–51)
HGB BLD-MCNC: 14.2 G/DL (ref 13–17.7)
HGB UR QL STRIP.AUTO: NEGATIVE
HOLD SPECIMEN: NORMAL
HOLD SPECIMEN: NORMAL
IMM GRANULOCYTES # BLD AUTO: 0.02 10*3/MM3 (ref 0–0.05)
IMM GRANULOCYTES NFR BLD AUTO: 0.3 % (ref 0–0.5)
KETONES UR QL STRIP: ABNORMAL
LEUKOCYTE ESTERASE UR QL STRIP.AUTO: NEGATIVE
LYMPHOCYTES # BLD AUTO: 2.5 10*3/MM3 (ref 0.7–3.1)
LYMPHOCYTES NFR BLD AUTO: 32.4 % (ref 19.6–45.3)
MAGNESIUM SERPL-MCNC: 2.1 MG/DL (ref 1.6–2.6)
MCH RBC QN AUTO: 31.7 PG (ref 26.6–33)
MCHC RBC AUTO-ENTMCNC: 33.6 G/DL (ref 31.5–35.7)
MCV RBC AUTO: 94.2 FL (ref 79–97)
METHADONE UR QL SCN: NEGATIVE
MONOCYTES # BLD AUTO: 0.97 10*3/MM3 (ref 0.1–0.9)
MONOCYTES NFR BLD AUTO: 12.6 % (ref 5–12)
NEUTROPHILS NFR BLD AUTO: 3.81 10*3/MM3 (ref 1.7–7)
NEUTROPHILS NFR BLD AUTO: 49.3 % (ref 42.7–76)
NITRITE UR QL STRIP: NEGATIVE
NRBC BLD AUTO-RTO: 0 /100 WBC (ref 0–0.2)
OPIATES UR QL: NEGATIVE
OXYCODONE UR QL SCN: NEGATIVE
PCP UR QL SCN: NEGATIVE
PH UR STRIP.AUTO: 6.5 [PH] (ref 5–8)
PLATELET # BLD AUTO: 175 10*3/MM3 (ref 140–450)
PMV BLD AUTO: 9.2 FL (ref 6–12)
POTASSIUM SERPL-SCNC: 4.4 MMOL/L (ref 3.5–5.2)
PROPOXYPH UR QL: NEGATIVE
PROT SERPL-MCNC: 7.1 G/DL (ref 6–8.5)
PROT UR QL STRIP: NEGATIVE
RBC # BLD AUTO: 4.48 10*6/MM3 (ref 4.14–5.8)
SODIUM SERPL-SCNC: 137 MMOL/L (ref 136–145)
SP GR UR STRIP: 1.02 (ref 1–1.03)
TRICYCLICS UR QL SCN: NEGATIVE
UROBILINOGEN UR QL STRIP: ABNORMAL
WBC NRBC COR # BLD: 7.72 10*3/MM3 (ref 3.4–10.8)
WHOLE BLOOD HOLD COAG: NORMAL
WHOLE BLOOD HOLD SPECIMEN: NORMAL

## 2023-09-06 PROCEDURE — 80053 COMPREHEN METABOLIC PANEL: CPT | Performed by: PHYSICIAN ASSISTANT

## 2023-09-06 PROCEDURE — 83735 ASSAY OF MAGNESIUM: CPT | Performed by: PHYSICIAN ASSISTANT

## 2023-09-06 PROCEDURE — 81003 URINALYSIS AUTO W/O SCOPE: CPT | Performed by: PHYSICIAN ASSISTANT

## 2023-09-06 PROCEDURE — 80307 DRUG TEST PRSMV CHEM ANLYZR: CPT | Performed by: PHYSICIAN ASSISTANT

## 2023-09-06 PROCEDURE — 36415 COLL VENOUS BLD VENIPUNCTURE: CPT

## 2023-09-06 PROCEDURE — 99285 EMERGENCY DEPT VISIT HI MDM: CPT

## 2023-09-06 PROCEDURE — 82077 ASSAY SPEC XCP UR&BREATH IA: CPT | Performed by: PHYSICIAN ASSISTANT

## 2023-09-06 PROCEDURE — 85025 COMPLETE CBC W/AUTO DIFF WBC: CPT | Performed by: PHYSICIAN ASSISTANT

## 2023-09-06 NOTE — NURSING NOTE
Pt intake assessment completed. Pt was brought to the hospital by [facility].   Pt states that he has been at [facility] for four days. Pt states that he has began withdrawling from substances so they wanted him to come here to medically detox. Pt states that he was drinking 1/5 or more of liquor a day, last use four days ago. Pt states that he was also using methamphetamine about 1g per day, last use 4 days ago. Pt states that he has also had fentanyl show up on a drug test multiple times so he thinks his meth has been laced with it. Pt states that he has thoughts of wishing he was dead or not wake up, but reports no suicidal thoughts, plan, or intent. Pt denies HI/AVH. Pt rate anxiety 8, depression 10, cravings 0, states sleep and appetite are poor. Pt states he has been having lots of night terrors recently. CIWA 6, COWS 7

## 2023-09-06 NOTE — NURSING NOTE
Spoke to [facility] with pt's permission to advise them of pt's discharge. Advised they will send someone to get pick him up. Staff aware that pt will be in waiting room waiting, verbalized understanding.

## 2023-09-06 NOTE — ED PROVIDER NOTES
"Subjective   History of Present Illness  43-year-old male who presents to the ED today for detox evaluation.  He reports that he needs detox from alcohol, fentanyl and methamphetamine.  He has been at the [facility] rehab facility for the last 4 days and has had worsening withdrawal symptoms therefore they sent him here for an evaluation.  He states that he was drinking a fifth of alcohol a day.  He states his last use of methamphetamine and alcohol was 4 days ago.  He states he last had fentanyl about 1 or 2 weeks ago.  He denies any suicidal ideations.  He states currently he has a headache and feels nauseous.  He states he is having body aches and tremors.  He states he feels dizzy and \"winded.\"    History provided by:  Patient  Drug / Alcohol Assessment  Primary symptoms comment: requesting detox. This is a new problem. The current episode started more than 2 days ago. The problem has been gradually worsening. Suspected agents include alcohol, methamphetamines and opiates. Associated symptoms include nausea. Pertinent negatives include no vomiting. Associated medical issues include addiction treatment.     Review of Systems   Constitutional: Negative.    HENT: Negative.     Eyes: Negative.    Respiratory:  Positive for shortness of breath.    Cardiovascular: Negative.    Gastrointestinal:  Positive for nausea. Negative for vomiting.   Genitourinary: Negative.    Musculoskeletal:  Positive for myalgias.   Skin: Negative.    Neurological:  Positive for dizziness, tremors and headaches.   Psychiatric/Behavioral:  Negative for suicidal ideas.    All other systems reviewed and are negative.    Past Medical History:   Diagnosis Date    Alcohol abuse     Anger reaction     Anxiety     Asthma     Depression     Hepatitis B     Hepatitis C     PTSD (post-traumatic stress disorder)        No Known Allergies    Past Surgical History:   Procedure Laterality Date    BONY PELVIS SURGERY      HIP SURGERY      KNEE " ARTHROSCOPY      WRIST SURGERY         History reviewed. No pertinent family history.    Social History     Socioeconomic History    Marital status: Single    Number of children: 0    Highest education level: 7th grade   Tobacco Use    Smoking status: Every Day     Packs/day: 1.00     Years: 28.00     Pack years: 28.00     Types: Cigarettes    Smokeless tobacco: Never   Vaping Use    Vaping Use: Never used   Substance and Sexual Activity    Alcohol use: Yes     Comment: see below    Drug use: Not Currently     Types: Amphetamines    Sexual activity: Yes     Partners: Female     Birth control/protection: None           Objective   Physical Exam  Vitals and nursing note reviewed.   Constitutional:       General: He is not in acute distress.     Appearance: Normal appearance.   HENT:      Head: Normocephalic and atraumatic.      Right Ear: External ear normal.      Left Ear: External ear normal.      Nose: Nose normal.   Eyes:      Conjunctiva/sclera: Conjunctivae normal.      Pupils: Pupils are equal, round, and reactive to light.   Cardiovascular:      Rate and Rhythm: Normal rate and regular rhythm.      Pulses: Normal pulses.      Heart sounds: Normal heart sounds.   Pulmonary:      Effort: Pulmonary effort is normal.      Breath sounds: Normal breath sounds.   Abdominal:      General: Bowel sounds are normal.      Palpations: Abdomen is soft.   Musculoskeletal:         General: Normal range of motion.      Cervical back: Normal range of motion and neck supple.   Skin:     General: Skin is warm and dry.      Capillary Refill: Capillary refill takes less than 2 seconds.   Neurological:      General: No focal deficit present.      Mental Status: He is alert and oriented to person, place, and time.   Psychiatric:         Mood and Affect: Mood is anxious.         Speech: Speech normal.         Behavior: Behavior is cooperative.         Thought Content: Thought content does not include homicidal or suicidal ideation.        Procedures       Results for orders placed or performed during the hospital encounter of 09/06/23   Comprehensive Metabolic Panel    Specimen: Blood   Result Value Ref Range    Glucose 68 65 - 99 mg/dL    BUN 8 6 - 20 mg/dL    Creatinine 0.69 (L) 0.76 - 1.27 mg/dL    Sodium 137 136 - 145 mmol/L    Potassium 4.4 3.5 - 5.2 mmol/L    Chloride 102 98 - 107 mmol/L    CO2 25.6 22.0 - 29.0 mmol/L    Calcium 9.2 8.6 - 10.5 mg/dL    Total Protein 7.1 6.0 - 8.5 g/dL    Albumin 4.1 3.5 - 5.2 g/dL    ALT (SGPT) 338 (H) 1 - 41 U/L    AST (SGOT) 372 (H) 1 - 40 U/L    Alkaline Phosphatase 69 39 - 117 U/L    Total Bilirubin 0.3 0.0 - 1.2 mg/dL    Globulin 3.0 gm/dL    A/G Ratio 1.4 g/dL    BUN/Creatinine Ratio 11.6 7.0 - 25.0    Anion Gap 9.4 5.0 - 15.0 mmol/L    eGFR 116.3 >60.0 mL/min/1.73   Urinalysis With Microscopic If Indicated (No Culture) - Urine, Clean Catch    Specimen: Urine, Clean Catch   Result Value Ref Range    Color, UA Dark Yellow (A) Yellow, Straw    Appearance, UA Clear Clear    pH, UA 6.5 5.0 - 8.0    Specific Gravity, UA 1.022 1.005 - 1.030    Glucose, UA Negative Negative    Ketones, UA Trace (A) Negative    Bilirubin, UA Negative Negative    Blood, UA Negative Negative    Protein, UA Negative Negative    Leuk Esterase, UA Negative Negative    Nitrite, UA Negative Negative    Urobilinogen, UA 1.0 E.U./dL 0.2 - 1.0 E.U./dL   Ethanol    Specimen: Blood   Result Value Ref Range    Ethanol <10 0 - 10 mg/dL    Ethanol % <0.010 %   Urine Drug Screen - Urine, Clean Catch    Specimen: Urine, Clean Catch   Result Value Ref Range    THC, Screen, Urine Positive (A) Negative    Phencyclidine (PCP), Urine Negative Negative    Cocaine Screen, Urine Negative Negative    Methamphetamine, Ur Positive (A) Negative    Opiate Screen Negative Negative    Amphetamine Screen, Urine Positive (A) Negative    Benzodiazepine Screen, Urine Positive (A) Negative    Tricyclic Antidepressants Screen Negative Negative    Methadone  Screen, Urine Negative Negative    Barbiturates Screen, Urine Negative Negative    Oxycodone Screen, Urine Negative Negative    Propoxyphene Screen Negative Negative    Buprenorphine, Screen, Urine Negative Negative   Magnesium    Specimen: Blood   Result Value Ref Range    Magnesium 2.1 1.6 - 2.6 mg/dL   CBC Auto Differential    Specimen: Blood   Result Value Ref Range    WBC 7.72 3.40 - 10.80 10*3/mm3    RBC 4.48 4.14 - 5.80 10*6/mm3    Hemoglobin 14.2 13.0 - 17.7 g/dL    Hematocrit 42.2 37.5 - 51.0 %    MCV 94.2 79.0 - 97.0 fL    MCH 31.7 26.6 - 33.0 pg    MCHC 33.6 31.5 - 35.7 g/dL    RDW 13.0 12.3 - 15.4 %    RDW-SD 45.4 37.0 - 54.0 fl    MPV 9.2 6.0 - 12.0 fL    Platelets 175 140 - 450 10*3/mm3    Neutrophil % 49.3 42.7 - 76.0 %    Lymphocyte % 32.4 19.6 - 45.3 %    Monocyte % 12.6 (H) 5.0 - 12.0 %    Eosinophil % 4.1 0.3 - 6.2 %    Basophil % 1.3 0.0 - 1.5 %    Immature Grans % 0.3 0.0 - 0.5 %    Neutrophils, Absolute 3.81 1.70 - 7.00 10*3/mm3    Lymphocytes, Absolute 2.50 0.70 - 3.10 10*3/mm3    Monocytes, Absolute 0.97 (H) 0.10 - 0.90 10*3/mm3    Eosinophils, Absolute 0.32 0.00 - 0.40 10*3/mm3    Basophils, Absolute 0.10 0.00 - 0.20 10*3/mm3    Immature Grans, Absolute 0.02 0.00 - 0.05 10*3/mm3    nRBC 0.0 0.0 - 0.2 /100 WBC   Fentanyl, Urine - Urine, Clean Catch    Specimen: Urine, Clean Catch   Result Value Ref Range    Fentanyl, Urine Negative Negative   Green Top (Gel)   Result Value Ref Range    Extra Tube Hold for add-ons.    Lavender Top   Result Value Ref Range    Extra Tube hold for add-on    Gold Top - SST   Result Value Ref Range    Extra Tube Hold for add-ons.    Light Blue Top   Result Value Ref Range    Extra Tube Hold for add-ons.           ED Course  ED Course as of 09/06/23 1705   Wed Sep 06, 2023   1215 Medically clear for detox [AH]      ED Course User Index  [AH] Halina Coronado, PA                                           Medical Decision Making  43-year-old male who presents to the ED  today for detox evaluation.  He was medically cleared for the evaluation.  Psychiatry was consulted who advised he does not meet inpatient criteria at this time.  He will be discharged back to his rehab facility.    Problems Addressed:  Polysubstance abuse: complicated acute illness or injury    Amount and/or Complexity of Data Reviewed  Labs: ordered.        Final diagnoses:   Polysubstance abuse       ED Disposition  ED Disposition       ED Disposition   Discharge    Condition   Stable    Comment   --               PATIENT CONNECTION - [facility]  See Provider List  [facility] [place] [address]  [phone]  Schedule an appointment as soon as possible for a visit in 1 week  As needed         Medication List      No changes were made to your prescriptions during this visit.            Halina Coronado PA  09/06/23 [id number]

## 2023-09-06 NOTE — NURSING NOTE
Reviewed discharge paperwork and advised pt that if he begins to experience thoughts of SI/HI/AVH or goes into active withdrawal to call 911, [phone], or report to the nearest ED, verbalized understanding.

## 2023-09-06 NOTE — NURSING NOTE
Spoke to Dr. Craft via phone. Discussed pt assessment, labs, and vitals. Advised that because pt has not used in 4 days and he is not in active withdrawal that he does not meet criteria for admission at this time. MD advised that pt return to rehab and if pt begins actively withdrawling to report back. [id number]    ER Provider and pt aware

## 2023-09-06 NOTE — NURSING NOTE
Patient pockets emptied. Search completed with two staff members present. Items logged and placed in cabinet in intake area. Pt placed in safe room for evaluation.    21

## 2023-09-06 NOTE — DISCHARGE INSTRUCTIONS
You are medically cleared to return back to your rehab facility.  Return to the ED at any time if symptoms change or worsen.

## 2023-10-13 ENCOUNTER — TELEPHONE (OUTPATIENT)
Dept: INTERNAL MEDICINE | Facility: CLINIC | Age: 43
End: 2023-10-13

## 2023-10-13 ENCOUNTER — OFFICE VISIT (OUTPATIENT)
Dept: INTERNAL MEDICINE | Facility: CLINIC | Age: 43
End: 2023-10-13
Payer: MEDICAID

## 2023-10-13 VITALS
TEMPERATURE: 97.7 F | BODY MASS INDEX: 22.46 KG/M2 | OXYGEN SATURATION: 95 % | WEIGHT: 175 LBS | DIASTOLIC BLOOD PRESSURE: 86 MMHG | HEART RATE: 106 BPM | SYSTOLIC BLOOD PRESSURE: 114 MMHG | HEIGHT: 74 IN | RESPIRATION RATE: 20 BRPM

## 2023-10-13 DIAGNOSIS — F19.11 HISTORY OF SUBSTANCE ABUSE: ICD-10-CM

## 2023-10-13 DIAGNOSIS — F41.1 GAD (GENERALIZED ANXIETY DISORDER): Primary | ICD-10-CM

## 2023-10-13 DIAGNOSIS — F31.81 BIPOLAR 2 DISORDER: ICD-10-CM

## 2023-10-13 DIAGNOSIS — F43.10 PTSD (POST-TRAUMATIC STRESS DISORDER): ICD-10-CM

## 2023-10-13 DIAGNOSIS — F20.9 SCHIZOPHRENIA, UNSPECIFIED TYPE: ICD-10-CM

## 2023-10-13 DIAGNOSIS — F51.01 PRIMARY INSOMNIA: ICD-10-CM

## 2023-10-13 PROCEDURE — 1159F MED LIST DOCD IN RCRD: CPT | Performed by: FAMILY MEDICINE

## 2023-10-13 PROCEDURE — 99204 OFFICE O/P NEW MOD 45 MIN: CPT | Performed by: FAMILY MEDICINE

## 2023-10-13 PROCEDURE — 1160F RVW MEDS BY RX/DR IN RCRD: CPT | Performed by: FAMILY MEDICINE

## 2023-10-13 RX ORDER — BUSPIRONE HYDROCHLORIDE 15 MG/1
15 TABLET ORAL 3 TIMES DAILY
Qty: 90 TABLET | Refills: 3 | Status: SHIPPED | OUTPATIENT
Start: 2023-10-13

## 2023-10-13 RX ORDER — TRAZODONE HYDROCHLORIDE 150 MG/1
150 TABLET ORAL NIGHTLY
Qty: 30 TABLET | Refills: 3 | Status: SHIPPED | OUTPATIENT
Start: 2023-10-13

## 2023-10-13 RX ORDER — OLANZAPINE 5 MG/1
5 TABLET ORAL 2 TIMES DAILY
Qty: 60 TABLET | Refills: 3 | Status: SHIPPED | OUTPATIENT
Start: 2023-10-13

## 2023-10-13 RX ORDER — BUSPIRONE HYDROCHLORIDE 5 MG/1
5 TABLET ORAL DAILY
COMMUNITY
End: 2023-10-13

## 2023-10-13 RX ORDER — OLANZAPINE 5 MG/1
5 TABLET ORAL 2 TIMES DAILY
COMMUNITY
End: 2023-10-13 | Stop reason: SDUPTHER

## 2023-10-13 NOTE — PROGRESS NOTES
"    Office Note     Name: Joey Mcdonald    : 1980     MRN: [id number]     Chief Complaint  Establish Care and Med Refill    Subjective     History of Present Illness:  Joey Mcdonald is a 43 y.o. male who presents today for several concerns and to establish care.  He is living in a retirement house after rehabilitation for alcohol and crystal meth and he states he has a history of anxiety and PTSD and schizophrenia and depression and bipolar disorder.  He was on medications up until yesterday and he ran out and today's first day medicine being gone.  He needs refills on several of them and he states his BuSpar used to be at a higher dose and he took it 3 times a day and he used to be on trazodone and they have not been giving him that and he is having a lot of trouble sleeping and he is willing to see a psychiatrist again.  He states he was originally in a retirement house in [place] and was moved to [place] because of a zoning problem with the house in [place].  He is originally from [place], [place] and plans on staying in [place] and specifically in [place] when he finishes the rehabilitation program and getting a job.  He has no other chronic health problems.  Labs will not be obtained today because the lab in this building is closed and he does not have a ride to another building.  Past medical history: As above  Social history: Positive tobacco, negative EtOH, he is living in a retirement house currently and not working and has no children.    Review of Systems   Respiratory:  Negative for cough, shortness of breath and wheezing.    Cardiovascular:  Negative for chest pain and palpitations.   Gastrointestinal:  Negative for blood in stool, diarrhea and vomiting.   Genitourinary:  Negative for dysuria, flank pain and genital sores.       Objective     Vital Signs  /86   Pulse 106   Temp 97.7 øF (36.5 øC) (Temporal)   Resp 20   Ht 188 cm (74.02\")   Wt 79.4 kg (175 lb)   SpO2 95%   " "BMI 22.46 kg/mý   Estimated body mass index is 22.46 kg/mý as calculated from the following:    Height as of this encounter: 188 cm (74.02\").    Weight as of this encounter: 79.4 kg (175 lb).    BMI is within normal parameters. No other follow-up for BMI required.      Physical Exam  Vitals and nursing note reviewed.   HENT:      Head: Normocephalic and atraumatic.   Neck:      Vascular: No carotid bruit.   Cardiovascular:      Rate and Rhythm: Normal rate and regular rhythm.      Heart sounds: Normal heart sounds. No murmur heard.     No gallop.   Pulmonary:      Effort: Pulmonary effort is normal. No respiratory distress.      Breath sounds: No stridor. No wheezing, rhonchi or rales.   Musculoskeletal:      Cervical back: Normal range of motion and neck supple.      Right lower leg: No edema.      Left lower leg: No edema.   Lymphadenopathy:      Cervical: No cervical adenopathy.   Neurological:      Mental Status: He is alert.                 Assessment and Plan     Diagnoses and all orders for this visit:    1. GWEN (generalized anxiety disorder) (Primary) his BuSpar will be increased to 15 mg 3 times daily  -     Ambulatory Referral to Psychiatry  -     busPIRone (BUSPAR) 15 MG tablet; Take 1 tablet by mouth 3 (Three) Times a Day.  Dispense: 90 tablet; Refill: 3    2. Primary insomnia his trazodone dose will be added back  -     traZODone (DESYREL) 150 MG tablet; Take 1 tablet by mouth Every Night.  Dispense: 30 tablet; Refill: 3    3. Bipolar 2 disorder stable with his current medication  -     Ambulatory Referral to Psychiatry  -     OLANZapine (zyPREXA) 5 MG tablet; Take 1 tablet by mouth 2 (Two) Times a Day.  Dispense: 60 tablet; Refill: 3    4. PTSD (post-traumatic stress disorder) stable with his current medication    5. Schizophrenia, unspecified type stable with his current medication  -     Ambulatory Referral to Psychiatry  -     OLANZapine (zyPREXA) 5 MG tablet; Take 1 tablet by mouth 2 (Two) Times a " Day.  Dispense: 60 tablet; Refill: 3    6.  History of substance abuse-specifically alcohol and crystal meth and he is currently living in a correction house and participating in a rehab program      Follow Up  Return in about 2 months (around 12/13/2023) for Recheck.    Reina Pelayo DO

## 2023-10-13 NOTE — TELEPHONE ENCOUNTER
PATIENT HAS CALLED AND STATED PRIOR AUTHORIZATION IS NEEDED ON HIS OLANZAPINE.    CALL BACK NUMBER [phone]

## 2023-10-18 NOTE — TELEPHONE ENCOUNTER
FOR THIS PT CALLING TO CHECK ON THE PA FOR OLANZAPINE 5MG.  HE HAS REQUESTED A CALL BACK WITH AN UPDATE AS SOON WE CAN.

## 2023-10-19 NOTE — TELEPHONE ENCOUNTER
is not on  KAT[facility]N.     Called and lvm for patient to return call, office number given.     HUB TO RELAY:     Prior Authorization has been approved for the Olanzapine.

## 2023-10-20 NOTE — TELEPHONE ENCOUNTER
Called and spoke with patient, informed him that PA for Olanzapine has been approved. He verbalized understanding and had no further questions.

## 2025-04-30 ENCOUNTER — HOSPITAL ENCOUNTER (EMERGENCY)
Facility: HOSPITAL | Age: 45
Discharge: HOME | End: 2025-04-30
Payer: MEDICAID

## 2025-04-30 VITALS
WEIGHT: 176 LBS | SYSTOLIC BLOOD PRESSURE: 129 MMHG | TEMPERATURE: 98 F | BODY MASS INDEX: 22.59 KG/M2 | HEART RATE: 60 BPM | RESPIRATION RATE: 18 BRPM | OXYGEN SATURATION: 100 % | HEIGHT: 74 IN | DIASTOLIC BLOOD PRESSURE: 92 MMHG

## 2025-04-30 DIAGNOSIS — F19.10 SUBSTANCE ABUSE: Primary | ICD-10-CM

## 2025-04-30 LAB
ALBUMIN SERPL-MCNC: 4.8 G/DL (ref 3.5–5.2)
ALBUMIN/GLOB SERPL: 1.4 G/DL
ALP SERPL-CCNC: 121 U/L (ref 39–117)
ALT SERPL W P-5'-P-CCNC: 87 U/L (ref 1–41)
AMPHET+METHAMPHET UR QL: NEGATIVE
AMPHETAMINES UR QL: NEGATIVE
ANION GAP SERPL CALCULATED.3IONS-SCNC: 12.3 MMOL/L (ref 5–15)
AST SERPL-CCNC: 81 U/L (ref 1–40)
BACTERIA UR QL AUTO: ABNORMAL /HPF
BARBITURATES UR QL SCN: NEGATIVE
BASOPHILS # BLD AUTO: 0.08 10*3/MM3 (ref 0–0.2)
BASOPHILS NFR BLD AUTO: 0.8 % (ref 0–1.5)
BENZODIAZ UR QL SCN: POSITIVE
BILIRUB SERPL-MCNC: 0.9 MG/DL (ref 0–1.2)
BILIRUB UR QL STRIP: ABNORMAL
BUN SERPL-MCNC: 10 MG/DL (ref 6–20)
BUN/CREAT SERPL: 12.8 (ref 7–25)
BUPRENORPHINE SERPL-MCNC: NEGATIVE NG/ML
CALCIUM SPEC-SCNC: 9.5 MG/DL (ref 8.6–10.5)
CANNABINOIDS SERPL QL: POSITIVE
CHLORIDE SERPL-SCNC: 103 MMOL/L (ref 98–107)
CLARITY UR: ABNORMAL
CO2 SERPL-SCNC: 23.7 MMOL/L (ref 22–29)
COCAINE UR QL: POSITIVE
COLOR UR: ABNORMAL
CREAT SERPL-MCNC: 0.78 MG/DL (ref 0.76–1.27)
DEPRECATED RDW RBC AUTO: 47.8 FL (ref 37–54)
EGFRCR SERPLBLD CKD-EPI 2021: 111.4 ML/MIN/1.73
EOSINOPHIL # BLD AUTO: 0.02 10*3/MM3 (ref 0–0.4)
EOSINOPHIL NFR BLD AUTO: 0.2 % (ref 0.3–6.2)
ERYTHROCYTE [DISTWIDTH] IN BLOOD BY AUTOMATED COUNT: 14.6 % (ref 12.3–15.4)
ETHANOL BLD-MCNC: <10 MG/DL (ref 0–10)
ETHANOL UR QL: <0.01 %
FENTANYL UR-MCNC: NEGATIVE NG/ML
GLOBULIN UR ELPH-MCNC: 3.4 GM/DL
GLUCOSE SERPL-MCNC: 110 MG/DL (ref 65–99)
GLUCOSE UR STRIP-MCNC: NEGATIVE MG/DL
HCT VFR BLD AUTO: 47.7 % (ref 37.5–51)
HGB BLD-MCNC: 16.5 G/DL (ref 13–17.7)
HGB UR QL STRIP.AUTO: NEGATIVE
HOLD SPECIMEN: NORMAL
HYALINE CASTS UR QL AUTO: ABNORMAL /LPF
IMM GRANULOCYTES # BLD AUTO: 0.03 10*3/MM3 (ref 0–0.05)
IMM GRANULOCYTES NFR BLD AUTO: 0.3 % (ref 0–0.5)
KETONES UR QL STRIP: ABNORMAL
LEUKOCYTE ESTERASE UR QL STRIP.AUTO: ABNORMAL
LYMPHOCYTES # BLD AUTO: 2.27 10*3/MM3 (ref 0.7–3.1)
LYMPHOCYTES NFR BLD AUTO: 22.3 % (ref 19.6–45.3)
MAGNESIUM SERPL-MCNC: 1.9 MG/DL (ref 1.6–2.6)
MCH RBC QN AUTO: 31.3 PG (ref 26.6–33)
MCHC RBC AUTO-ENTMCNC: 34.6 G/DL (ref 31.5–35.7)
MCV RBC AUTO: 90.3 FL (ref 79–97)
METHADONE UR QL SCN: NEGATIVE
MONOCYTES # BLD AUTO: 0.68 10*3/MM3 (ref 0.1–0.9)
MONOCYTES NFR BLD AUTO: 6.7 % (ref 5–12)
NEUTROPHILS NFR BLD AUTO: 69.7 % (ref 42.7–76)
NEUTROPHILS NFR BLD AUTO: 7.12 10*3/MM3 (ref 1.7–7)
NITRITE UR QL STRIP: POSITIVE
NRBC BLD AUTO-RTO: 0 /100 WBC (ref 0–0.2)
OPIATES UR QL: NEGATIVE
OXYCODONE UR QL SCN: NEGATIVE
PCP UR QL SCN: NEGATIVE
PH UR STRIP.AUTO: 5.5 [PH] (ref 5–8)
PLATELET # BLD AUTO: 340 10*3/MM3 (ref 140–450)
PMV BLD AUTO: 9.2 FL (ref 6–12)
POTASSIUM SERPL-SCNC: 3.9 MMOL/L (ref 3.5–5.2)
PROT SERPL-MCNC: 8.2 G/DL (ref 6–8.5)
PROT UR QL STRIP: ABNORMAL
RBC # BLD AUTO: 5.28 10*6/MM3 (ref 4.14–5.8)
RBC # UR STRIP: ABNORMAL /HPF
REF LAB TEST METHOD: ABNORMAL
SODIUM SERPL-SCNC: 139 MMOL/L (ref 136–145)
SP GR UR STRIP: >1.03 (ref 1–1.03)
SQUAMOUS #/AREA URNS HPF: ABNORMAL /HPF
TRICYCLICS UR QL SCN: NEGATIVE
UROBILINOGEN UR QL STRIP: ABNORMAL
WBC # UR STRIP: ABNORMAL /HPF
WBC NRBC COR # BLD AUTO: 10.2 10*3/MM3 (ref 3.4–10.8)
WHOLE BLOOD HOLD COAG: NORMAL
WHOLE BLOOD HOLD SPECIMEN: NORMAL

## 2025-04-30 PROCEDURE — 81001 URINALYSIS AUTO W/SCOPE: CPT | Performed by: PHYSICIAN ASSISTANT

## 2025-04-30 PROCEDURE — 80307 DRUG TEST PRSMV CHEM ANLYZR: CPT | Performed by: PHYSICIAN ASSISTANT

## 2025-04-30 PROCEDURE — 85025 COMPLETE CBC W/AUTO DIFF WBC: CPT | Performed by: PHYSICIAN ASSISTANT

## 2025-04-30 PROCEDURE — 87086 URINE CULTURE/COLONY COUNT: CPT | Performed by: PHYSICIAN ASSISTANT

## 2025-04-30 PROCEDURE — 36415 COLL VENOUS BLD VENIPUNCTURE: CPT

## 2025-04-30 PROCEDURE — 99283 EMERGENCY DEPT VISIT LOW MDM: CPT

## 2025-04-30 PROCEDURE — 82077 ASSAY SPEC XCP UR&BREATH IA: CPT | Performed by: PHYSICIAN ASSISTANT

## 2025-04-30 PROCEDURE — 83735 ASSAY OF MAGNESIUM: CPT | Performed by: PHYSICIAN ASSISTANT

## 2025-04-30 PROCEDURE — 80053 COMPREHEN METABOLIC PANEL: CPT | Performed by: PHYSICIAN ASSISTANT

## 2025-04-30 NOTE — NURSING NOTE
Pt approaches this RN and states he is feeling better and really doesn't feel he needs to be here so he wishes to return to rehab.     Pt denies SI/HI/AVH.     Made  aware pt doesn't want to do intake process and wishes to return to rehab. Ok to discharge pt.   Made ED provider aware.

## 2025-04-30 NOTE — NURSING NOTE
Pt states he drank 4 pints of vodka around 11pm last night. Made him aware we will wait on his ETOH level before doing assessment.

## 2025-04-30 NOTE — NURSING NOTE
Pt to intake. Search completed per policy. Two staff members present.     Pt has $21.00 sent to security.

## 2025-04-30 NOTE — DISCHARGE INSTRUCTIONS
Follow-up with your primary care provider or mental health provider in the office at the next available appointment.  Return to the ED at any time if symptoms change or worsen.

## 2025-05-01 LAB — BACTERIA SPEC AEROBE CULT: NO GROWTH

## 2025-05-01 NOTE — ED PROVIDER NOTES
Subjective   History of Present Illness  44 year old male who presents to the ED today for a detox evaluation. He went to rehab last night and they brought him here today. He drinks 4-5 pints of alcohol per day, last drink was last night. He also uses cocaine, marijuana and opiates. He states currently he feels shaky and is having nausea and diarrhea. He denies suicidal ideations.    History provided by:  Patient  Drug / Alcohol Assessment  Primary symptoms comment: requesting detox. This is a new problem. The current episode started 6 to 12 hours ago. The problem has been gradually worsening. Suspected agents include alcohol, opiates and cocaine. Associated symptoms include nausea. Associated medical issues include addiction treatment.       Review of Systems   Constitutional: Negative.    HENT: Negative.     Eyes: Negative.    Respiratory: Negative.     Cardiovascular: Negative.    Gastrointestinal:  Positive for diarrhea and nausea.   Genitourinary: Negative.    Musculoskeletal: Negative.    Skin: Negative.    Neurological:  Positive for tremors.   Psychiatric/Behavioral:  Negative for suicidal ideas.    All other systems reviewed and are negative.      Past Medical History:   Diagnosis Date    Alcohol abuse     Anger reaction     Anxiety     Asthma     Depression     Hepatitis B     Hepatitis C     PTSD (post-traumatic stress disorder)        Allergies   Allergen Reactions    Penicillins Rash       Past Surgical History:   Procedure Laterality Date    BONY PELVIS SURGERY      HIP SURGERY      KNEE ARTHROSCOPY      WRIST SURGERY         No family history on file.    Social History     Socioeconomic History    Marital status: Single    Number of children: 0    Highest education level: 7th grade   Tobacco Use    Smoking status: Every Day     Current packs/day: 1.00     Average packs/day: 1 pack/day for 28.0 years (28.0 ttl pk-yrs)     Types: Cigarettes    Smokeless tobacco: Never   Vaping Use    Vaping status:  Never Used   Substance and Sexual Activity    Alcohol use: Not Currently     Comment: see below    Drug use: Not Currently     Types: Amphetamines    Sexual activity: Not Currently     Partners: Female     Birth control/protection: None           Objective   Physical Exam  Vitals and nursing note reviewed.   Constitutional:       General: He is not in acute distress.     Appearance: Normal appearance.   HENT:      Head: Normocephalic and atraumatic.      Right Ear: External ear normal.      Left Ear: External ear normal.      Nose: Nose normal.   Eyes:      Conjunctiva/sclera: Conjunctivae normal.      Pupils: Pupils are equal, round, and reactive to light.   Cardiovascular:      Rate and Rhythm: Normal rate and regular rhythm.      Pulses: Normal pulses.      Heart sounds: Normal heart sounds.   Pulmonary:      Effort: Pulmonary effort is normal.      Breath sounds: Normal breath sounds.   Abdominal:      General: Bowel sounds are normal.      Palpations: Abdomen is soft.   Musculoskeletal:         General: Normal range of motion.      Cervical back: Normal range of motion and neck supple.   Skin:     General: Skin is warm and dry.      Capillary Refill: Capillary refill takes less than 2 seconds.   Neurological:      General: No focal deficit present.      Mental Status: He is alert and oriented to person, place, and time.   Psychiatric:         Mood and Affect: Mood normal.         Thought Content: Thought content does not include homicidal or suicidal ideation.         Procedures       Results for orders placed or performed during the hospital encounter of 04/30/25   Urinalysis With Microscopic If Indicated (No Culture) - Urine, Clean Catch    Collection Time: 04/30/25  1:45 PM    Specimen: Urine, Clean Catch   Result Value Ref Range    Color, UA Dark Yellow (A) Yellow, Straw    Appearance, UA Cloudy (A) Clear    pH, UA 5.5 5.0 - 8.0    Specific Gravity, UA >1.030 (H) 1.005 - 1.030    Glucose, UA Negative  Negative    Ketones, UA 40 mg/dL (2+) (A) Negative    Bilirubin, UA Moderate (2+) (A) Negative    Blood, UA Negative Negative    Protein,  mg/dL (2+) (A) Negative    Leuk Esterase, UA Small (1+) (A) Negative    Nitrite, UA Positive (A) Negative    Urobilinogen, UA 1.0 E.U./dL 0.2 - 1.0 E.U./dL   Urine Drug Screen - Urine, Clean Catch    Collection Time: 04/30/25  1:45 PM    Specimen: Urine, Clean Catch   Result Value Ref Range    THC, Screen, Urine Positive (A) Negative    Phencyclidine (PCP), Urine Negative Negative    Cocaine Screen, Urine Positive (A) Negative    Methamphetamine, Ur Negative Negative    Opiate Screen Negative Negative    Amphetamine Screen, Urine Negative Negative    Benzodiazepine Screen, Urine Positive (A) Negative    Tricyclic Antidepressants Screen Negative Negative    Methadone Screen, Urine Negative Negative    Barbiturates Screen, Urine Negative Negative    Oxycodone Screen, Urine Negative Negative    Buprenorphine, Screen, Urine Negative Negative   Fentanyl, Urine - Urine, Clean Catch    Collection Time: 04/30/25  1:45 PM    Specimen: Urine, Clean Catch   Result Value Ref Range    Fentanyl, Urine Negative Negative   Urinalysis, Microscopic Only - Urine, Clean Catch    Collection Time: 04/30/25  1:45 PM    Specimen: Urine, Clean Catch   Result Value Ref Range    RBC, UA 3-5 (A) None Seen, 0-2 /HPF    WBC, UA 6-10 (A) None Seen, 0-2 /HPF    Bacteria, UA None Seen None Seen /HPF    Squamous Epithelial Cells, UA 3-6 (A) None Seen, 0-2 /HPF    Hyaline Casts, UA 7-12 None Seen /LPF    Methodology Automated Microscopy    [place] Urine Culture Tube - Urine, Clean Catch    Collection Time: 04/30/25  1:45 PM    Specimen: Urine, Clean Catch   Result Value Ref Range    Extra Tube Hold for add-ons.    Comprehensive Metabolic Panel    Collection Time: 04/30/25  1:51 PM    Specimen: Blood   Result Value Ref Range    Glucose 110 (H) 65 - 99 mg/dL    BUN 10 6 - 20 mg/dL    Creatinine 0.78 0.76 -  1.27 mg/dL    Sodium 139 136 - 145 mmol/L    Potassium 3.9 3.5 - 5.2 mmol/L    Chloride 103 98 - 107 mmol/L    CO2 23.7 22.0 - 29.0 mmol/L    Calcium 9.5 8.6 - 10.5 mg/dL    Total Protein 8.2 6.0 - 8.5 g/dL    Albumin 4.8 3.5 - 5.2 g/dL    ALT (SGPT) 87 (H) 1 - 41 U/L    AST (SGOT) 81 (H) 1 - 40 U/L    Alkaline Phosphatase 121 (H) 39 - 117 U/L    Total Bilirubin 0.9 0.0 - 1.2 mg/dL    Globulin 3.4 gm/dL    A/G Ratio 1.4 g/dL    BUN/Creatinine Ratio 12.8 7.0 - 25.0    Anion Gap 12.3 5.0 - 15.0 mmol/L    eGFR 111.4 >60.0 mL/min/1.73   Ethanol    Collection Time: 04/30/25  1:51 PM    Specimen: Blood   Result Value Ref Range    Ethanol <10 0 - 10 mg/dL    Ethanol % <0.010 %   Magnesium    Collection Time: 04/30/25  1:51 PM    Specimen: Blood   Result Value Ref Range    Magnesium 1.9 1.6 - 2.6 mg/dL   CBC Auto Differential    Collection Time: 04/30/25  1:51 PM    Specimen: Blood   Result Value Ref Range    WBC 10.20 3.40 - 10.80 10*3/mm3    RBC 5.28 4.14 - 5.80 10*6/mm3    Hemoglobin 16.5 13.0 - 17.7 g/dL    Hematocrit 47.7 37.5 - 51.0 %    MCV 90.3 79.0 - 97.0 fL    MCH 31.3 26.6 - 33.0 pg    MCHC 34.6 31.5 - 35.7 g/dL    RDW 14.6 12.3 - 15.4 %    RDW-SD 47.8 37.0 - 54.0 fl    MPV 9.2 6.0 - 12.0 fL    Platelets 340 140 - 450 10*3/mm3    Neutrophil % 69.7 42.7 - 76.0 %    Lymphocyte % 22.3 19.6 - 45.3 %    Monocyte % 6.7 5.0 - 12.0 %    Eosinophil % 0.2 (L) 0.3 - 6.2 %    Basophil % 0.8 0.0 - 1.5 %    Immature Grans % 0.3 0.0 - 0.5 %    Neutrophils, Absolute 7.12 (H) 1.70 - 7.00 10*3/mm3    Lymphocytes, Absolute 2.27 0.70 - 3.10 10*3/mm3    Monocytes, Absolute 0.68 0.10 - 0.90 10*3/mm3    Eosinophils, Absolute 0.02 0.00 - 0.40 10*3/mm3    Basophils, Absolute 0.08 0.00 - 0.20 10*3/mm3    Immature Grans, Absolute 0.03 0.00 - 0.05 10*3/mm3    nRBC 0.0 0.0 - 0.2 /100 WBC   Green Top (Gel)    Collection Time: 04/30/25  1:51 PM   Result Value Ref Range    Extra Tube Hold for add-ons.    Lavender Top    Collection Time:  04/30/25  1:51 PM   Result Value Ref Range    Extra Tube hold for add-on    Gold Top - SST    Collection Time: 04/30/25  1:51 PM   Result Value Ref Range    Extra Tube Hold for add-ons.    Light Blue Top    Collection Time: 04/30/25  1:51 PM   Result Value Ref Range    Extra Tube Hold for add-ons.           ED Course                                                       Medical Decision Making  44 year old male who presents to the ED for a detox evaluation. Prior to completing the process he decided he wanted to return to his rehab facility. Psychiatry agreeable with this and he will be discharged.    Problems Addressed:  Substance abuse: complicated acute illness or injury    Amount and/or Complexity of Data Reviewed  Labs: ordered.        Final diagnoses:   Substance abuse       ED Disposition  ED Disposition       ED Disposition   Discharge    Condition   Stable    Comment   --               PATIENT CONNECTION - [facility]  See Provider List  [facility] [place] [address]  [phone]  Call in 1 day           Medication List      No changes were made to your prescriptions during this visit.            Halina Coronado, PA  04/30/25 2700